# Patient Record
Sex: FEMALE | Race: WHITE | NOT HISPANIC OR LATINO | Employment: FULL TIME | URBAN - METROPOLITAN AREA
[De-identification: names, ages, dates, MRNs, and addresses within clinical notes are randomized per-mention and may not be internally consistent; named-entity substitution may affect disease eponyms.]

---

## 2023-10-25 ENCOUNTER — TELEMEDICINE (OUTPATIENT)
Dept: PRIMARY CARE | Facility: CLINIC | Age: 63
End: 2023-10-25
Payer: COMMERCIAL

## 2023-10-25 ENCOUNTER — TELEPHONE VISIT (OUTPATIENT)
Dept: INTERNAL MEDICINE | Facility: HOSPITAL | Age: 63
End: 2023-10-25

## 2023-10-25 DIAGNOSIS — N95.2 VAGINAL ATROPHY: ICD-10-CM

## 2023-10-25 DIAGNOSIS — N30.00 ACUTE CYSTITIS WITHOUT HEMATURIA: ICD-10-CM

## 2023-10-25 DIAGNOSIS — N30.00 ACUTE CYSTITIS WITHOUT HEMATURIA: Primary | ICD-10-CM

## 2023-10-25 LAB
APPEARANCE UR: ABNORMAL
BILIRUB UR STRIP.AUTO-MCNC: NEGATIVE MG/DL
COLOR UR: YELLOW
GLUCOSE UR STRIP.AUTO-MCNC: NEGATIVE MG/DL
KETONES UR STRIP.AUTO-MCNC: NEGATIVE MG/DL
LEUKOCYTE ESTERASE UR QL STRIP.AUTO: ABNORMAL
NITRITE UR QL STRIP.AUTO: NEGATIVE
PH UR STRIP.AUTO: 6 [PH]
PROT UR STRIP.AUTO-MCNC: ABNORMAL MG/DL
RBC # UR STRIP.AUTO: NEGATIVE /UL
RBC #/AREA URNS AUTO: ABNORMAL /HPF
SP GR UR STRIP.AUTO: 1.02
SQUAMOUS #/AREA URNS AUTO: ABNORMAL /HPF
UROBILINOGEN UR STRIP.AUTO-MCNC: <2 MG/DL
WBC #/AREA URNS AUTO: >50 /HPF
WBC CLUMPS #/AREA URNS AUTO: ABNORMAL /HPF

## 2023-10-25 PROCEDURE — 99441 PR PHYS/QHP TELEPHONE EVALUATION 5-10 MIN: CPT | Performed by: INTERNAL MEDICINE

## 2023-10-25 PROCEDURE — 87186 SC STD MICRODIL/AGAR DIL: CPT

## 2023-10-25 PROCEDURE — 87086 URINE CULTURE/COLONY COUNT: CPT

## 2023-10-25 PROCEDURE — 81001 URINALYSIS AUTO W/SCOPE: CPT

## 2023-10-25 RX ORDER — ALBUTEROL SULFATE 90 UG/1
AEROSOL, METERED RESPIRATORY (INHALATION)
COMMUNITY
Start: 2016-01-12

## 2023-10-25 RX ORDER — TRETINOIN 1 MG/G
CREAM TOPICAL
COMMUNITY
Start: 2014-02-25

## 2023-10-25 RX ORDER — MONTELUKAST SODIUM 10 MG/1
10 TABLET ORAL DAILY
COMMUNITY

## 2023-10-25 RX ORDER — FLUTICASONE PROPIONATE 50 MCG
SPRAY, SUSPENSION (ML) NASAL
COMMUNITY

## 2023-10-25 RX ORDER — ESTRADIOL 0.1 MG/G
CREAM VAGINAL
Qty: 42.5 G | Refills: 5 | Status: SHIPPED | OUTPATIENT
Start: 2023-10-25 | End: 2024-10-24

## 2023-10-25 RX ORDER — NITROFURANTOIN 25; 75 MG/1; MG/1
100 CAPSULE ORAL 2 TIMES DAILY
Qty: 10 CAPSULE | Refills: 0 | Status: SHIPPED | OUTPATIENT
Start: 2023-10-25 | End: 2023-10-30

## 2023-10-25 NOTE — PROGRESS NOTES
63 year old female on phone noting she had COVID around 8 weeks ago.  She took paxlovid.  At the beginning of the month she went to [place] and the whole time she had a sinus respiratory infection.  It finally cleared around a week ago.  A week ago she felt some dysuria with urinating which resolved and then returned.  No one else is sick.  She was in the [facility] and she did not stray off the resort and she has no GI symptoms.        Objective:   Speech fluent,  her voice is nasal on the phone,  no dyspnea appreciated      Provider Impressions   Dysuria - she says not totally consistent with a UTI but pressure with urination.  Will collect UA and urine C&S and start her on nitrofurantoin.  Also to resume the estradiol cream to see if that helps symptoms and decreases incidence of UTI.    Recurrent infections - if she continues with such frequent infections would like her to consider visit with our allergist/immunologist.      DID NOT ADDRESS AT THIS VISIT  1. Allergic rhinitis, maxillary sinusitis - good response to our regimen. Currently using allegra, azelastine, fluticasone, NETIPOT and montelukast. Did not use the amoxicillin.. In past saw Mike with little help. Offered instead to try visit with allergist (Dr. Lynette GARZA). No need for CT scan at this point. Reviewed how to wean off the meds and trial off although my need year round.   2. Nephrolithiasis - s/p left ureteroscopy for 8 mm ureteral calcium oxalate stone in 2019. Stent placed and removed. Urine stone evaluation risk factors sent in 2019 and results show increased acid, high calcium oxalate output and borderline increase in oxalate output and recommended increase fluid volume, try to alkalinize urine with calcium citrate, increase calcium in diet, avoid vitamin C and cut back on oxalate containing foods such as spinach, rhubarb, potatoes, peanuts, cashews and almonds.   3. Depression - she is off bupropion and doing better. Her son is in better  place and her mood is better with that.  4. Asthma - doing okay but allergy dependent. TRial montelukast successful   5. Fibrocystic Disease - mammogram July 2019 and ordered today   6. Menstrual changes - post menopausal. Doing better with hot flashes  7. Mole checks - nothing new today  8. Health maintenance -  PAP smear with negative HPV, 1/16, 1/21 next one due 01/26,   trial estrace tablet 10 mcg daily x 1 week then 1-2 times per week to see if helps with symptoms related to atrophy,   colonoscopy 2012 so repeat 2022, ordered   shingrix completed as is covid vaccination.

## 2023-10-25 NOTE — PATIENT INSTRUCTIONS
Patient Discussion/Summary     1. Good to talk to you by phone  2. Sounds like you have had a rough few months with regards to your health  3. Please drop off your urine for a urinalysis and urine culture and then start the 5 day course of nitrofurantoin.    4. Also ordered the estradiol cream and use a pea size amount at bedtime Monday, Wednesday and Friday  5. So lets plan to see you for your annual exam this winter.

## 2023-10-26 NOTE — PROGRESS NOTES
Left message that her urine was consistent with UTI so glad we started her on nitrofurantoin.  Will watch urine culture

## 2023-10-27 LAB — BACTERIA UR CULT: ABNORMAL

## 2023-11-03 ENCOUNTER — TELEPHONE (OUTPATIENT)
Dept: INFECTIOUS DISEASES | Facility: CLINIC | Age: 63
End: 2023-11-03

## 2023-11-12 PROBLEM — N20.0 NEPHROLITHIASIS: Status: ACTIVE | Noted: 2023-11-12

## 2023-11-12 PROBLEM — J45.909 ASTHMA (HHS-HCC): Status: ACTIVE | Noted: 2023-11-12

## 2023-11-12 PROBLEM — L82.1 OTHER SEBORRHEIC KERATOSIS: Status: ACTIVE | Noted: 2021-05-26

## 2023-11-12 PROBLEM — R21 RASH AND OTHER NONSPECIFIC SKIN ERUPTION: Status: ACTIVE | Noted: 2021-05-26

## 2023-11-12 PROBLEM — L81.9 ATYPICAL PIGMENTED SKIN LESION: Status: ACTIVE | Noted: 2023-11-12

## 2023-11-12 PROBLEM — Z86.59 HISTORY OF DEPRESSION: Status: ACTIVE | Noted: 2023-11-12

## 2023-11-12 PROBLEM — J30.9 ALLERGIC RHINITIS: Status: ACTIVE | Noted: 2023-11-12

## 2023-11-12 PROBLEM — N95.2 VAGINAL ATROPHY: Status: ACTIVE | Noted: 2023-11-12

## 2023-11-12 PROBLEM — J34.2 DEVIATED NASAL SEPTUM: Status: ACTIVE | Noted: 2023-11-12

## 2023-11-12 PROBLEM — H93.8X1 EAR FULLNESS, RIGHT: Status: ACTIVE | Noted: 2023-11-12

## 2023-11-12 PROBLEM — K64.8 PROLAPSED INTERNAL HEMORRHOIDS: Status: ACTIVE | Noted: 2023-11-12

## 2023-11-12 PROBLEM — N60.19 FIBROCYSTIC BREAST: Status: ACTIVE | Noted: 2023-11-12

## 2023-11-12 PROBLEM — R32 INCONTINENCE OF URINE: Status: ACTIVE | Noted: 2023-11-12

## 2023-11-12 RX ORDER — VALACYCLOVIR HYDROCHLORIDE 1 G/1
1000 TABLET, FILM COATED ORAL DAILY
COMMUNITY

## 2023-11-14 ENCOUNTER — OFFICE VISIT (OUTPATIENT)
Dept: PRIMARY CARE | Facility: CLINIC | Age: 63
End: 2023-11-14
Payer: COMMERCIAL

## 2023-11-14 VITALS
DIASTOLIC BLOOD PRESSURE: 83 MMHG | HEIGHT: 69 IN | BODY MASS INDEX: 23.4 KG/M2 | SYSTOLIC BLOOD PRESSURE: 119 MMHG | WEIGHT: 158 LBS | HEART RATE: 72 BPM

## 2023-11-14 DIAGNOSIS — N60.19 FIBROCYSTIC BREAST DISEASE (FCBD), UNSPECIFIED LATERALITY: ICD-10-CM

## 2023-11-14 DIAGNOSIS — K64.8 PROLAPSED INTERNAL HEMORRHOIDS: ICD-10-CM

## 2023-11-14 DIAGNOSIS — J34.2 DEVIATED NASAL SEPTUM: ICD-10-CM

## 2023-11-14 DIAGNOSIS — J30.9 ALLERGIC RHINITIS, UNSPECIFIED SEASONALITY, UNSPECIFIED TRIGGER: ICD-10-CM

## 2023-11-14 DIAGNOSIS — Z00.00 ANNUAL PHYSICAL EXAM: ICD-10-CM

## 2023-11-14 DIAGNOSIS — Z12.31 ENCOUNTER FOR SCREENING MAMMOGRAM FOR MALIGNANT NEOPLASM OF BREAST: ICD-10-CM

## 2023-11-14 DIAGNOSIS — N95.2 VAGINAL ATROPHY: ICD-10-CM

## 2023-11-14 DIAGNOSIS — N76.0 ACUTE VAGINITIS: Primary | ICD-10-CM

## 2023-11-14 DIAGNOSIS — N20.0 NEPHROLITHIASIS: ICD-10-CM

## 2023-11-14 PROCEDURE — 90686 IIV4 VACC NO PRSV 0.5 ML IM: CPT | Performed by: INTERNAL MEDICINE

## 2023-11-14 PROCEDURE — 90471 IMMUNIZATION ADMIN: CPT | Performed by: INTERNAL MEDICINE

## 2023-11-14 PROCEDURE — 1036F TOBACCO NON-USER: CPT | Performed by: INTERNAL MEDICINE

## 2023-11-14 PROCEDURE — 99396 PREV VISIT EST AGE 40-64: CPT | Performed by: INTERNAL MEDICINE

## 2023-11-14 RX ORDER — FLUCONAZOLE 150 MG/1
150 TABLET ORAL ONCE
Qty: 1 TABLET | Refills: 1 | Status: SHIPPED | OUTPATIENT
Start: 2023-11-14 | End: 2023-11-14

## 2023-11-14 ASSESSMENT — PAIN SCALES - GENERAL: PAINLEVEL: 0-NO PAIN

## 2023-11-14 NOTE — PATIENT INSTRUCTIONS
1. So good to see you   2. Continue the montelukast, flonase and antihistamine for the allergies.  Let me know if you ever want a referral to one of our allergists.    3. For cramps consider the following over the counter recommendations.    -         6 oz of tonic water with quinine twice a day or  -         6 oz of milk twice a day or  -         Magnesium 500 mg twice a day (magnesium causes loose bowels too) or  -         Tumeric - take what the bottle says.  4. Urine stone evaluation risk factors sent in 2019 and results show increased acid, high calcium oxalate output and borderline increase in oxalate output and recommended increase fluid volume, try to alkalinize urine with calcium citrate, increase calcium in diet, avoid vitamin C and cut back on oxalate containing foods such as spinach, rhubarb, potatoes, peanuts, cashews and almonds.   5. Try Benefiber to loosen the stools for the hemorrhoid   6. THanks for getting the colonoscopy. Repeat colonoscopy in 2027.   7.  Mammogram ordered  8.  PAP smear 2026  9.  Flu shot today.  Consider monovalent COVID vaccine near [facility] given your timing of disease.    10.  See you in a year unless you need me sooner.

## 2023-11-14 NOTE — PROGRESS NOTES
Subjective   Patient ID:   [id number]   [id number]   Jeanie Pickett is a 63 y.o. female who presents for annual exam.  No chief complaint on file..  HPI  63 year old female here for annual exam. She has a bit of itching and wonders if it is from the estradiol.  She is recovered from COVID.  She had a UTI and was treated last week.   Still unable to access me from [employer].  Her sons moved into a rental house.  Work is going well.  Work labs include  Total cholesterol 225  HDL 56    Triglyceride 90  Cardiac risk calculation 3.4%  She had leg cramps so started taking vitamin D then worried about taking vitamins.  Hemorrhoids rubber banded twice but still an issue.  She is going back to fiber and that helps.      ROS were reviewed and are negative with the exception of what is noted in HPI    There were no vitals taken for this visit.  Objective   Physical Exam    Assessment/Plan   Problem List Items Addressed This Visit    None    Provider Impressions     1. Allergic rhinitis,  sinusitis - good response to our regimen. Currently using allegra, azelastine, fluticasone, NETIPOT and montelukast. In past saw Mike with little help. Offered instead to try visit with allergist (Dr. Lynette GARZA). No need for CT scan at this point. Reviewed how to wean off the meds and trial off although my need year round.   2. Asthma - doing okay but allergy dependent. TRial montelukast successful   3. Nephrolithiasis - s/p left ureteroscopy for 8 mm ureteral calcium oxalate stone in 2019. Stent placed and removed. Urine stone evaluation risk factors sent in 2019 and results show increased acid, high calcium oxalate output and borderline increase in oxalate output and recommended increase fluid volume, try to alkalinize urine with calcium citrate, increase calcium in diet, avoid vitamin C and cut back on oxalate containing foods such as spinach, rhubarb, potatoes, peanuts, cashews and almonds. Reminded her of these recommendations  4.  Depression - she is off bupropion and doing better. Her sons are the determinants of her mood.    5.  Hemorrhoids - per surgery input  6. Fibrocystic Disease - mammogram July 2019 and ordered today   7. Post menopausal - Doing better with hot flashes. Started trial estrace tablet 10 mcg daily x 1 week then 1-2 times per week to see if helps with symptoms related to atrophy,. Just started so cannot tell if effective.     8. Mole checks - nothing new today  9. Health maintenance -  -PAP smear with negative HPV, 1/16, 1/21 next one due 01/26,        - colonoscopy 2012, repeat 2022 with single small tubular adenoma, repeat 2027    - immunizations:  flu vaccine today, shingrix completed, discuss covid vaccination.        Airam Givens MD

## 2023-12-13 ENCOUNTER — HOSPITAL ENCOUNTER (OUTPATIENT)
Dept: RADIOLOGY | Facility: HOSPITAL | Age: 63
Discharge: HOME | End: 2023-12-13
Payer: COMMERCIAL

## 2023-12-13 DIAGNOSIS — Z12.31 ENCOUNTER FOR SCREENING MAMMOGRAM FOR MALIGNANT NEOPLASM OF BREAST: ICD-10-CM

## 2023-12-13 PROCEDURE — 77063 BREAST TOMOSYNTHESIS BI: CPT | Performed by: RADIOLOGY

## 2023-12-13 PROCEDURE — 77063 BREAST TOMOSYNTHESIS BI: CPT

## 2023-12-13 PROCEDURE — 77067 SCR MAMMO BI INCL CAD: CPT | Performed by: RADIOLOGY

## 2024-03-06 ENCOUNTER — LAB (OUTPATIENT)
Dept: LAB | Facility: LAB | Age: 64
End: 2024-03-06
Payer: COMMERCIAL

## 2024-03-06 DIAGNOSIS — N39.0 UTI (URINARY TRACT INFECTION), UNCOMPLICATED: ICD-10-CM

## 2024-03-06 LAB
APPEARANCE UR: ABNORMAL
BACTERIA #/AREA URNS AUTO: ABNORMAL /HPF
BILIRUB UR STRIP.AUTO-MCNC: NEGATIVE MG/DL
COLOR UR: ABNORMAL
GLUCOSE UR STRIP.AUTO-MCNC: NORMAL MG/DL
KETONES UR STRIP.AUTO-MCNC: NEGATIVE MG/DL
LEUKOCYTE ESTERASE UR QL STRIP.AUTO: ABNORMAL
MUCOUS THREADS #/AREA URNS AUTO: ABNORMAL /LPF
NITRITE UR QL STRIP.AUTO: NEGATIVE
PH UR STRIP.AUTO: 6 [PH]
PROT UR STRIP.AUTO-MCNC: ABNORMAL MG/DL
RBC # UR STRIP.AUTO: NEGATIVE /UL
RBC #/AREA URNS AUTO: ABNORMAL /HPF
SP GR UR STRIP.AUTO: 1.02
SQUAMOUS #/AREA URNS AUTO: ABNORMAL /HPF
UROBILINOGEN UR STRIP.AUTO-MCNC: NORMAL MG/DL
WBC #/AREA URNS AUTO: >50 /HPF
WBC CLUMPS #/AREA URNS AUTO: ABNORMAL /HPF

## 2024-03-06 PROCEDURE — 87086 URINE CULTURE/COLONY COUNT: CPT

## 2024-03-06 PROCEDURE — 81001 URINALYSIS AUTO W/SCOPE: CPT

## 2024-03-06 PROCEDURE — 87186 SC STD MICRODIL/AGAR DIL: CPT

## 2024-03-08 DIAGNOSIS — R32 URINARY INCONTINENCE, UNSPECIFIED TYPE: ICD-10-CM

## 2024-03-08 DIAGNOSIS — N20.1 URETERAL STONE: ICD-10-CM

## 2024-03-08 DIAGNOSIS — N30.00 ACUTE CYSTITIS WITHOUT HEMATURIA: Primary | ICD-10-CM

## 2024-03-08 LAB
BACTERIA UR CULT: ABNORMAL
BACTERIA UR CULT: ABNORMAL

## 2024-03-18 ENCOUNTER — OFFICE VISIT (OUTPATIENT)
Dept: OBSTETRICS AND GYNECOLOGY | Facility: CLINIC | Age: 64
End: 2024-03-18
Payer: COMMERCIAL

## 2024-03-18 VITALS
DIASTOLIC BLOOD PRESSURE: 83 MMHG | HEART RATE: 75 BPM | BODY MASS INDEX: 23.25 KG/M2 | SYSTOLIC BLOOD PRESSURE: 138 MMHG | HEIGHT: 69 IN | WEIGHT: 157 LBS

## 2024-03-18 DIAGNOSIS — N39.0 RECURRENT UTI: Primary | ICD-10-CM

## 2024-03-18 DIAGNOSIS — R32 URINARY INCONTINENCE, UNSPECIFIED TYPE: ICD-10-CM

## 2024-03-18 LAB
POC APPEARANCE, URINE: CLEAR
POC BILIRUBIN, URINE: NEGATIVE
POC BLOOD, URINE: NEGATIVE
POC COLOR, URINE: YELLOW
POC GLUCOSE, URINE: NEGATIVE MG/DL
POC KETONES, URINE: NEGATIVE MG/DL
POC LEUKOCYTES, URINE: ABNORMAL
POC NITRITE,URINE: NEGATIVE
POC PH, URINE: 6.5 PH
POC PROTEIN, URINE: NEGATIVE MG/DL
POC SPECIFIC GRAVITY, URINE: >=1.03
POC UROBILINOGEN, URINE: 0.2 EU/DL

## 2024-03-18 PROCEDURE — 81003 URINALYSIS AUTO W/O SCOPE: CPT | Performed by: NURSE PRACTITIONER

## 2024-03-18 PROCEDURE — 99203 OFFICE O/P NEW LOW 30 MIN: CPT | Performed by: NURSE PRACTITIONER

## 2024-03-18 PROCEDURE — 1036F TOBACCO NON-USER: CPT | Performed by: NURSE PRACTITIONER

## 2024-03-18 PROCEDURE — 99213 OFFICE O/P EST LOW 20 MIN: CPT | Performed by: NURSE PRACTITIONER

## 2024-03-18 ASSESSMENT — PAIN SCALES - GENERAL: PAINLEVEL: 0-NO PAIN

## 2024-03-18 NOTE — PROGRESS NOTES
Referring Physician: Airam Givens MD     General medical background:     - Hx of kidney stones surgically removed in 2019. Last CT urography of kidneys was in 2021 and revealed no kidney stones. She was advised to follow a low oxalate diet for hx of stones.   - 2 UTIs in the past 4-5 months. Prior to this, she has had less than 5 UTIs in her life.   - Dr. Givens does her pelvic exams and paps. She gave a rx for vaginal estrogen cream.     Urine Cx:  - 3/6/24: 20,000 - 80,000 Escherichia coli and >100,000 Streptococcus agalactiae (Group B Streptococcus)   - 10/25/23: 20,000 - 80,000 Escherichia coli     Obstetric/Gynecologic History:  Jeanie Pickett has a history of : 2. Para: 2. No history of  section(s).  x2.     Review of Systems   All other systems reviewed and are negative.       HPI  Testing results:  PVR results are available and reviewed  : 14 ml   UA results available and reviewed  Laboratory:   Urine dipstick shows negative.      History:  Stress Symptoms:   - Leaks with coughing, sneezing, and activity. TANYA does not occur daily. She feels overall better after she no longer has a UTI.   - Years ago, she used to wear pads but no longer needs them.   Urinary Symptoms:   - Once her UTI cleared, she was voiding less often and urinated more volume.   Vaginal Symptoms:   - Has Estradiol vaginal cream, but has not been using it. Patient thinks she used it 1x/week. She was previously started on this to help with lubrication for intercourse.   Rectal Symptoms:   - She does have hemorrhoids and has had them banded in the past, but says this was ineffective. It is harder to clean when her hemorrhoids are more noticeable.   - Uses Benefiber.       Physical Exam  Genitourinary:      Genitourinary Comments: Atrophic vaginal tissue. No pain with palpation of urethra. Urethra is normal. Good Kegel tone. No urethral diverticulum. No pelvic pain.    POP-Q measurements were:      Aa: -1, Ba: C:  -7     gH: 3.5, pB: TVL:      Ap: -2, Bp: D:          Assessment and Plan  63 y.o. female being assessed for Sharon, TANYA, and hemorrhoids. Hx of kidney stones surgically removed in 2019.      Diagnoses:   #1 Recurrent UTI  #2 TANYA   #3 Hemorrhoids     Plan:   1. Recurrent UTI, vaginal atrophy   - Use estrogen cream transvaginally twice weekly at night. Apply 0.5-1 gram of estrogen with finger or applicator.   - We discussed that tv estrogen cream prevents UTIs in postmenopausal women with low estrogen.    - Patient would like to restart tv estrogen to see if this completely resolves her UTIs. If her Sharon continued, we will repeat imaging to rule out nephrolithiasis given her hx. It may take 2 months before noticing the full effect of the estrogen cream.  She did not realize using the cream nadeen prevent UTIs.   - She was given a handout to read on Sharon.       2. TANYA  - This is generally only bothersome when she has a UTI, so she will start tv estrogen to prevent UTIs.       3. Hemorrhoids   - Continue with Benefiber to bulk stools and prevent worsening of hemorrhoids.      Follow-up in 3 months with KENNY Morrow.     Scribe Attestation:   I, Erin Cota, am scribing for virtually, and in the presence of KENNY Morrow on 3/18/24 at 12:03 PM.   I, KENNY Morrow, personally performed the services described in this documentation which was scribed virtually and I confirm that it is both accurate and complete.     KENNY Morrow

## 2024-06-11 ENCOUNTER — APPOINTMENT (OUTPATIENT)
Dept: OBSTETRICS AND GYNECOLOGY | Facility: CLINIC | Age: 64
End: 2024-06-11
Payer: COMMERCIAL

## 2024-08-20 ENCOUNTER — LAB (OUTPATIENT)
Dept: LAB | Facility: LAB | Age: 64
End: 2024-08-20
Payer: COMMERCIAL

## 2024-08-20 ENCOUNTER — TELEPHONE (OUTPATIENT)
Dept: OBSTETRICS AND GYNECOLOGY | Facility: CLINIC | Age: 64
End: 2024-08-20
Payer: COMMERCIAL

## 2024-08-20 DIAGNOSIS — R39.9 UTI SYMPTOMS: ICD-10-CM

## 2024-08-20 DIAGNOSIS — N39.0 RECURRENT UTI: ICD-10-CM

## 2024-08-20 LAB
APPEARANCE UR: CLEAR
BILIRUB UR STRIP.AUTO-MCNC: NEGATIVE MG/DL
COLOR UR: ABNORMAL
GLUCOSE UR STRIP.AUTO-MCNC: NORMAL MG/DL
HOLD SPECIMEN: NORMAL
KETONES UR STRIP.AUTO-MCNC: NEGATIVE MG/DL
LEUKOCYTE ESTERASE UR QL STRIP.AUTO: ABNORMAL
MUCOUS THREADS #/AREA URNS AUTO: NORMAL /LPF
NITRITE UR QL STRIP.AUTO: NEGATIVE
PH UR STRIP.AUTO: 5.5 [PH]
PROT UR STRIP.AUTO-MCNC: NEGATIVE MG/DL
RBC # UR STRIP.AUTO: NEGATIVE /UL
RBC #/AREA URNS AUTO: NORMAL /HPF
SP GR UR STRIP.AUTO: 1.02
SQUAMOUS #/AREA URNS AUTO: NORMAL /HPF
UROBILINOGEN UR STRIP.AUTO-MCNC: NORMAL MG/DL
WBC #/AREA URNS AUTO: NORMAL /HPF

## 2024-08-20 PROCEDURE — 81001 URINALYSIS AUTO W/SCOPE: CPT

## 2024-08-20 PROCEDURE — 87086 URINE CULTURE/COLONY COUNT: CPT

## 2024-08-20 NOTE — TELEPHONE ENCOUNTER
Jeanie Pickett reported s/sx UTI, order for urinalysis with reflex culture and microscopic entered per protocol

## 2024-08-21 ENCOUNTER — TELEPHONE (OUTPATIENT)
Dept: OBSTETRICS AND GYNECOLOGY | Facility: CLINIC | Age: 64
End: 2024-08-21
Payer: COMMERCIAL

## 2024-08-21 LAB — BACTERIA UR CULT: ABNORMAL

## 2024-08-21 NOTE — TELEPHONE ENCOUNTER
----- Message from Sabrina Andres sent at 8/21/2024  1:48 PM EDT -----  GBS does not typically requirement antibiotic treatment unless having dysuria.

## 2024-08-21 NOTE — TELEPHONE ENCOUNTER
Called patient to discuss urine results and current signs, symptoms or concerns. Received patient voicemail,  left message for patient to call the office.

## 2024-08-22 ENCOUNTER — TELEPHONE (OUTPATIENT)
Dept: OBSTETRICS AND GYNECOLOGY | Facility: CLINIC | Age: 64
End: 2024-08-22
Payer: COMMERCIAL

## 2024-08-22 NOTE — TELEPHONE ENCOUNTER
Feeling better, will monitor and if feeling worse by Mon will possibly retest/take atb per Sabrina's recommendation because she is going to [place] Sept 3

## 2024-09-24 DIAGNOSIS — N39.0 RECURRENT UTI: ICD-10-CM

## 2024-10-17 ENCOUNTER — PATIENT MESSAGE (OUTPATIENT)
Dept: PRIMARY CARE | Facility: CLINIC | Age: 64
End: 2024-10-17
Payer: COMMERCIAL

## 2024-10-17 DIAGNOSIS — B00.9 HSV (HERPES SIMPLEX VIRUS) INFECTION: Primary | ICD-10-CM

## 2024-10-18 RX ORDER — ALBUTEROL SULFATE 90 UG/1
2 INHALANT RESPIRATORY (INHALATION) EVERY 6 HOURS PRN
Qty: 18 G | Refills: 1 | Status: SHIPPED | OUTPATIENT
Start: 2024-10-18

## 2024-10-18 RX ORDER — VALACYCLOVIR HYDROCHLORIDE 1 G/1
1000 TABLET, FILM COATED ORAL DAILY
Qty: 30 TABLET | Refills: 2 | Status: SHIPPED | OUTPATIENT
Start: 2024-10-18

## 2024-11-19 ENCOUNTER — APPOINTMENT (OUTPATIENT)
Dept: PRIMARY CARE | Facility: CLINIC | Age: 64
End: 2024-11-19
Payer: COMMERCIAL

## 2024-11-19 ENCOUNTER — LAB (OUTPATIENT)
Dept: LAB | Facility: LAB | Age: 64
End: 2024-11-19
Payer: COMMERCIAL

## 2024-11-19 VITALS
BODY MASS INDEX: 23.04 KG/M2 | WEIGHT: 156 LBS | SYSTOLIC BLOOD PRESSURE: 133 MMHG | DIASTOLIC BLOOD PRESSURE: 86 MMHG | HEART RATE: 63 BPM

## 2024-11-19 DIAGNOSIS — Z00.00 ANNUAL PHYSICAL EXAM: ICD-10-CM

## 2024-11-19 DIAGNOSIS — J30.9 ALLERGIC RHINITIS, UNSPECIFIED SEASONALITY, UNSPECIFIED TRIGGER: ICD-10-CM

## 2024-11-19 DIAGNOSIS — N20.0 NEPHROLITHIASIS: ICD-10-CM

## 2024-11-19 DIAGNOSIS — F41.9 ANXIETY: ICD-10-CM

## 2024-11-19 DIAGNOSIS — R20.2 NUMBNESS AND TINGLING: ICD-10-CM

## 2024-11-19 DIAGNOSIS — N60.19 FIBROCYSTIC BREAST DISEASE (FCBD), UNSPECIFIED LATERALITY: ICD-10-CM

## 2024-11-19 DIAGNOSIS — R20.0 NUMBNESS AND TINGLING: ICD-10-CM

## 2024-11-19 DIAGNOSIS — J45.20 MILD INTERMITTENT ASTHMA, UNSPECIFIED WHETHER COMPLICATED (HHS-HCC): ICD-10-CM

## 2024-11-19 DIAGNOSIS — N95.2 VAGINAL ATROPHY: ICD-10-CM

## 2024-11-19 DIAGNOSIS — Z12.31 ENCOUNTER FOR SCREENING MAMMOGRAM FOR MALIGNANT NEOPLASM OF BREAST: Primary | ICD-10-CM

## 2024-11-19 DIAGNOSIS — F32.A DEPRESSION, UNSPECIFIED DEPRESSION TYPE: ICD-10-CM

## 2024-11-19 LAB
ALBUMIN SERPL BCP-MCNC: 4.6 G/DL (ref 3.4–5)
ALP SERPL-CCNC: 71 U/L (ref 33–136)
ALT SERPL W P-5'-P-CCNC: 19 U/L (ref 7–45)
ANION GAP SERPL CALC-SCNC: 10 MMOL/L (ref 10–20)
AST SERPL W P-5'-P-CCNC: 14 U/L (ref 9–39)
BASOPHILS # BLD AUTO: 0.02 X10*3/UL (ref 0–0.1)
BASOPHILS NFR BLD AUTO: 0.5 %
BILIRUB SERPL-MCNC: 0.6 MG/DL (ref 0–1.2)
BUN SERPL-MCNC: 13 MG/DL (ref 6–23)
CALCIUM SERPL-MCNC: 9.4 MG/DL (ref 8.6–10.6)
CHLORIDE SERPL-SCNC: 105 MMOL/L (ref 98–107)
CO2 SERPL-SCNC: 31 MMOL/L (ref 21–32)
CREAT SERPL-MCNC: 0.77 MG/DL (ref 0.5–1.05)
EGFRCR SERPLBLD CKD-EPI 2021: 87 ML/MIN/1.73M*2
EOSINOPHIL # BLD AUTO: 0.07 X10*3/UL (ref 0–0.7)
EOSINOPHIL NFR BLD AUTO: 1.7 %
ERYTHROCYTE [DISTWIDTH] IN BLOOD BY AUTOMATED COUNT: 12.3 % (ref 11.5–14.5)
EST. AVERAGE GLUCOSE BLD GHB EST-MCNC: 103 MG/DL
GLUCOSE SERPL-MCNC: 91 MG/DL (ref 74–99)
HBA1C MFR BLD: 5.2 %
HCT VFR BLD AUTO: 46.4 % (ref 36–46)
HGB BLD-MCNC: 14.9 G/DL (ref 12–16)
IMM GRANULOCYTES # BLD AUTO: 0.01 X10*3/UL (ref 0–0.7)
IMM GRANULOCYTES NFR BLD AUTO: 0.2 % (ref 0–0.9)
LYMPHOCYTES # BLD AUTO: 1.4 X10*3/UL (ref 1.2–4.8)
LYMPHOCYTES NFR BLD AUTO: 33.4 %
MCH RBC QN AUTO: 30.3 PG (ref 26–34)
MCHC RBC AUTO-ENTMCNC: 32.1 G/DL (ref 32–36)
MCV RBC AUTO: 95 FL (ref 80–100)
MONOCYTES # BLD AUTO: 0.38 X10*3/UL (ref 0.1–1)
MONOCYTES NFR BLD AUTO: 9.1 %
NEUTROPHILS # BLD AUTO: 2.31 X10*3/UL (ref 1.2–7.7)
NEUTROPHILS NFR BLD AUTO: 55.1 %
NRBC BLD-RTO: 0 /100 WBCS (ref 0–0)
PLATELET # BLD AUTO: 182 X10*3/UL (ref 150–450)
POTASSIUM SERPL-SCNC: 4.4 MMOL/L (ref 3.5–5.3)
PROT SERPL-MCNC: 7.1 G/DL (ref 6.4–8.2)
RBC # BLD AUTO: 4.91 X10*6/UL (ref 4–5.2)
SODIUM SERPL-SCNC: 142 MMOL/L (ref 136–145)
TSH SERPL-ACNC: 1.94 MIU/L (ref 0.44–3.98)
VIT B12 SERPL-MCNC: 339 PG/ML (ref 211–911)
WBC # BLD AUTO: 4.2 X10*3/UL (ref 4.4–11.3)

## 2024-11-19 PROCEDURE — 80053 COMPREHEN METABOLIC PANEL: CPT

## 2024-11-19 PROCEDURE — 90656 IIV3 VACC NO PRSV 0.5 ML IM: CPT | Performed by: INTERNAL MEDICINE

## 2024-11-19 PROCEDURE — 83036 HEMOGLOBIN GLYCOSYLATED A1C: CPT

## 2024-11-19 PROCEDURE — 85025 COMPLETE CBC W/AUTO DIFF WBC: CPT

## 2024-11-19 PROCEDURE — 36415 COLL VENOUS BLD VENIPUNCTURE: CPT

## 2024-11-19 PROCEDURE — 90677 PCV20 VACCINE IM: CPT | Performed by: INTERNAL MEDICINE

## 2024-11-19 PROCEDURE — 90471 IMMUNIZATION ADMIN: CPT | Performed by: INTERNAL MEDICINE

## 2024-11-19 PROCEDURE — 84443 ASSAY THYROID STIM HORMONE: CPT

## 2024-11-19 PROCEDURE — 82607 VITAMIN B-12: CPT

## 2024-11-19 PROCEDURE — 90472 IMMUNIZATION ADMIN EACH ADD: CPT | Performed by: INTERNAL MEDICINE

## 2024-11-19 PROCEDURE — 99396 PREV VISIT EST AGE 40-64: CPT | Performed by: INTERNAL MEDICINE

## 2024-11-19 RX ORDER — BUPROPION HYDROCHLORIDE 150 MG/1
150 TABLET ORAL EVERY MORNING
Qty: 90 TABLET | Refills: 3 | Status: SHIPPED | OUTPATIENT
Start: 2024-11-19 | End: 2025-11-19

## 2024-11-19 ASSESSMENT — PATIENT HEALTH QUESTIONNAIRE - PHQ9
1. LITTLE INTEREST OR PLEASURE IN DOING THINGS: SEVERAL DAYS
SUM OF ALL RESPONSES TO PHQ9 QUESTIONS 1 & 2: 2
2. FEELING DOWN, DEPRESSED OR HOPELESS: SEVERAL DAYS
10. IF YOU CHECKED OFF ANY PROBLEMS, HOW DIFFICULT HAVE THESE PROBLEMS MADE IT FOR YOU TO DO YOUR WORK, TAKE CARE OF THINGS AT HOME, OR GET ALONG WITH OTHER PEOPLE: SOMEWHAT DIFFICULT

## 2024-11-19 NOTE — PROGRESS NOTES
Subjective   Patient ID:   [id number]   [id number]   Jeanie Pickett is a 64 y.o. female who presents for No chief complaint on file..  HPI    64 year old female here for annual exam.  She notes she always had foot cramps and then started having leg cramps and went on line and started to take Magnesium and helped the cramps but she had a sensation of something crawling under her skin.  Over the [ast few months she notes that she started to awake in the middle of the night with intense anxiety and tingling in both hand from elbow down and in the past she would need to warm them up and it was okay.  She feels wam under the covers and does not feel cold.  She feels overheated.  Her hands are tingling but now not cold.  She was back on line and she heard about nutrient deficiencies.  She wondered about Vitamin D and Vitamin B12.  She wondered if this was the source of her anxiety and discomfort.  Her oldest son is going to get engaged and he is working to try to get a business off the ground.  Her younger son is a  for a storage facility company.  She took some wellness classes but notes she has no attention span.  She recognizes that she needs to take time to help her ownself beside worroying about everyone else.  She plans to work for another few years at least.  Work helps her get her mind off her children's issues.  In 2018 trial of lexapro but she feels never really helped.  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  She does yoga by [employer] in evening and weekend mornings to help her.      ROS were reviewed and are negative with the exception of what is noted in HPI.      There were no vitals taken for this visit.  Objective   Physical Exam  Vitals reviewed.   Constitutional:       General: She is not in acute distress.     Appearance: She is not ill-appearing or toxic-appearing.   HENT:      Head: Normocephalic and atraumatic.      Right Ear: Tympanic membrane  normal.      Left Ear: Tympanic membrane normal.      Mouth/Throat:      Mouth: Mucous membranes are moist.   Eyes:      Extraocular Movements: Extraocular movements intact.      Pupils: Pupils are equal, round, and reactive to light.   Cardiovascular:      Rate and Rhythm: Normal rate and regular rhythm.      Heart sounds: Murmur heard.      No friction rub. No gallop.      Comments: 1/6 systolic murmur heard 2nd intercostal space right  Pulmonary:      Effort: Pulmonary effort is normal.      Breath sounds: No wheezing, rhonchi or rales.   Chest:      Comments: No dominant masses or discharge,  cystic bilaterally especially lower breasts  Abdominal:      Palpations: Abdomen is soft.      Tenderness: There is no abdominal tenderness. There is no guarding.      Hernia: No hernia is present.   Musculoskeletal:         General: No swelling.      Cervical back: Neck supple.   Lymphadenopathy:      Cervical: No cervical adenopathy.   Neurological:      Mental Status: She is alert and oriented to person, place, and time.      Cranial Nerves: No cranial nerve deficit.      Motor: No weakness.      Gait: Gait normal.      Comments: Negative Tinel's either hand         Assessment/Plan   Problem List Items Addressed This Visit    None    # arm numbness - unclear etiology,  is this related to hyperventilation when she wakes up early,  is it related to position during sleep,  negative Tinels and not present now.  She is not inclined to start a new medication.  EMG ordered and neurology referral.    # Allergic rhinitis,  sinusitis - past response to more extensive regimen. Currently using allegra, fluticasone, NETIPOT. Stopped the azelastine and montelukast.  In past saw Mike with little help. Offered in past visit with allergist and may pursue future. She stopped the montelukast as saw it could affect depression and anxiety.  The nasal rinse is the best for her.    #. Asthma - doing okay but allergy dependent. TRial  montelukast successful but stopped with increase in depression.  #. Nephrolithiasis - s/p left ureteroscopy for 8 mm ureteral calcium oxalate stone in 2019. Stent placed and removed. Urine stone evaluation risk factors sent in 2019 and results show increased acid, high calcium oxalate output and borderline increase in oxalate output and recommended increase fluid volume, try to alkalinize urine with calcium citrate, increase calcium in diet, avoid vitamin C and cut back on oxalate containing foods such as spinach, rhubarb, potatoes, peanuts, cashews and almonds. Reminded her of these recommendations  #. Depression - we resumed her bupropion and doing fair.  Feel she should try CBT and she is aware she does better when she engages in regular exercise and will resume yoga. Her sons are the determinants of her mood.    #.  Hemorrhoids - per surgery input.  She had them rubber banded twice per Tereza.  Magneisum helps soften her stools and she eats a lot of fiber.  Epsom salt baths are helpful.    #. Fibrocystic Disease - mammogram 12/23  and ordered repeat today   #. Mole checks - nothing new today  #. UTI, vaginal atrophy #. Post menopausal - Doing better with hot flashes. Sdid not clarify if still taking estrace tablet 10 mcg daily x 1 week then 1-2 times per week to help with atrophy.  She now sees urogynecology  #.Health maintenance -   -PAP smear with negative HPV, 1/16, 1/21 next one due 01/26,   - mammogram 12/23 ordered repeat this year.        - colonoscopy 2012, repeat 2022 with single small tubular adenoma, repeat 2027    - immunizations:  flu vaccine today, shingrix completed, discuss covid vaccination.   - labs - screening at work for lipids and copied for chart,  other tests ordered today  - dexa at age 67  - encourage healthy lifestyle    Airam Givens MD

## 2024-11-19 NOTE — PATIENT INSTRUCTIONS
Great to see you  2. Online cognitive behavioral therapy:  [email or web address]/think-cbt-worksheets  3.  Take 1000 international units  of vitamin D3 daily  4.  Love the yoga for you  5.  For the anxiety and depression the best thing will be to get back to yoga and mindfulness training.  Lets do a trial of bupropion (welbutrin) to see if this is helpful.  We are starting at 150 mg XL daily and would take in the morning.  Also would like to refer you to our access clinic to get some counseling advice.    6.  We will talk in 4 weeks to make sure that you are back on track.  If this medicine is not the right one would consider trazodone  7.  Love the magnesium for your bowels and cramps.  Can also try tumeric and milk as well to help with those symptoms  8.  PAP due 1/26  9.  Colonoscopy due 2027  10.  Mammogram ordered for second half of December  11.  For the numbness and tingling unsure how to explain.  Labs were ordered.  EMG of the muscles and nerves ordered to see if we can identify a nerve that is pinched.  Referral to neurology but unlikely to happen before the spring.   12. It has been a privilege and pleasure to be your doctor   13.  Flu and prevanr 20 today

## 2024-11-22 RX ORDER — ESTRADIOL 0.1 MG/G
CREAM VAGINAL
Qty: 42.5 G | Refills: 5 | Status: SHIPPED | OUTPATIENT
Start: 2024-11-22 | End: 2025-11-22

## 2024-12-11 ENCOUNTER — TELEPHONE (OUTPATIENT)
Dept: ENDOCRINOLOGY | Facility: CLINIC | Age: 64
End: 2024-12-11

## 2024-12-11 ENCOUNTER — APPOINTMENT (OUTPATIENT)
Dept: PRIMARY CARE | Facility: CLINIC | Age: 64
End: 2024-12-11
Payer: COMMERCIAL

## 2024-12-11 NOTE — TELEPHONE ENCOUNTER
Patient cancelled appt for today but still wanted to tell Dr. Givens the the yoga and meditation seems to have resolved the issues she was having and that she will be continuing that for now.

## 2024-12-17 ENCOUNTER — HOSPITAL ENCOUNTER (OUTPATIENT)
Dept: RADIOLOGY | Facility: HOSPITAL | Age: 64
Discharge: HOME | End: 2024-12-17
Payer: COMMERCIAL

## 2024-12-17 VITALS — WEIGHT: 154.32 LBS | HEIGHT: 69 IN | BODY MASS INDEX: 22.86 KG/M2

## 2024-12-17 DIAGNOSIS — Z12.31 ENCOUNTER FOR SCREENING MAMMOGRAM FOR MALIGNANT NEOPLASM OF BREAST: ICD-10-CM

## 2024-12-17 PROCEDURE — 77063 BREAST TOMOSYNTHESIS BI: CPT | Performed by: RADIOLOGY

## 2024-12-17 PROCEDURE — 77063 BREAST TOMOSYNTHESIS BI: CPT

## 2024-12-17 PROCEDURE — 77067 SCR MAMMO BI INCL CAD: CPT | Performed by: RADIOLOGY

## 2025-05-20 ENCOUNTER — APPOINTMENT (OUTPATIENT)
Dept: NEUROLOGY | Facility: HOSPITAL | Age: 65
End: 2025-05-20
Payer: COMMERCIAL

## 2025-07-23 ENCOUNTER — OFFICE VISIT (OUTPATIENT)
Dept: DERMATOLOGY | Facility: CLINIC | Age: 65
End: 2025-07-23
Payer: COMMERCIAL

## 2025-07-23 DIAGNOSIS — Z12.83 SCREENING EXAM FOR SKIN CANCER: ICD-10-CM

## 2025-07-23 DIAGNOSIS — L81.4 LENTIGO: ICD-10-CM

## 2025-07-23 DIAGNOSIS — D18.01 CHERRY HEMANGIOMA: Primary | ICD-10-CM

## 2025-07-23 DIAGNOSIS — L82.1 SEBORRHEIC KERATOSIS: ICD-10-CM

## 2025-07-23 DIAGNOSIS — D18.01 HEMANGIOMA OF SKIN: ICD-10-CM

## 2025-07-23 DIAGNOSIS — L57.9 SKIN CHANGES DUE TO CHRONIC EXPOSURE TO NONIONIZING RADIATION: ICD-10-CM

## 2025-07-23 PROCEDURE — 99203 OFFICE O/P NEW LOW 30 MIN: CPT | Performed by: STUDENT IN AN ORGANIZED HEALTH CARE EDUCATION/TRAINING PROGRAM

## 2025-07-23 NOTE — PROGRESS NOTES
Subjective     Jeanie Pickett is a 65 y.o. female who presents for the following: Suspicious Skin Lesion (To left forearm, present for 2 years. Dark in color. ).          Review of Systems:  No other skin or systemic complaints other than what is documented elsewhere in the note.    The following portions of the chart were reviewed this encounter and updated as appropriate:          Skin Cancer History  Biopsy Log Book  No skin cancers from Specimen Tracking.    Additional History      Specialty Problems          Dermatology Problems    Other seborrheic keratosis    Rash and other nonspecific skin eruption    Atypical pigmented skin lesion        Objective   Well appearing patient in no apparent distress; mood and affect are within normal limits.    A focused skin examination was performed. All findings within normal limits unless otherwise noted below.    Assessment/Plan   Skin Exam  1. CHERRY HEMANGIOMA  Generalized  This Visit  - Follow Up In Dermatology - Established Patient  2. HEMANGIOMA OF SKIN  Generalized  Cherry red papules to dark purple    -Discussed the nature of the diagnosis  -Reassurance, recommend continued observation  3. LENTIGO  Generalized  Scattered tan macules in sun-exposed areas.  Benign nature of these skin lesions reviewed and relation to sun exposure discussed. Reassurance provided. Reviewed warning signs of skin cancer.  4. SEBORRHEIC KERATOSIS  Generalized  Stuck on verrucous, tan-brown papules and plaques.    The benign nature of these skin lesions were reviewed with the patient today and reassurance was provided. Patient advised to return for f/u if the lesions change in size, shape, color, become painful, tender, itch or bleed.  5. SKIN CHANGES DUE TO CHRONIC EXPOSURE TO NONIONIZING RADIATION  Generalized  Mottled pigmentation, telangiectasias and brown reticular macules in sun exposed areas on the face, extremities, trunk  The risk of chronic, cumulative sun damage and risk of  development of skin cancer was reviewed with the patient today. Discussed important of sun protection with sun protective clothing and/or broad spectrum sunscreen spf 30 or above.  Warning signs of skin cancer were reviewed. Patient to contact office should they notice any new or changing pre-existing skin lesion.      Patient seen and examined with Dr. Anthony Wasserman MD   PGY2 Dermatology Resident  7/23/2025     I saw and evaluated the patient. I personally obtained the key and critical portions of the history and physical exam or was physically present for key and critical portions performed by the resident. I reviewed the resident's documentation and discussed the patient with the resident. I agree with the resident's medical decision making as documented in the note.    Rocío Cruz MD

## 2025-10-17 ENCOUNTER — APPOINTMENT (OUTPATIENT)
Dept: PRIMARY CARE | Facility: CLINIC | Age: 65
End: 2025-10-17
Payer: COMMERCIAL

## 2025-10-30 ENCOUNTER — APPOINTMENT (OUTPATIENT)
Dept: PRIMARY CARE | Facility: CLINIC | Age: 65
End: 2025-10-30
Payer: COMMERCIAL